# Patient Record
Sex: FEMALE | Race: WHITE | NOT HISPANIC OR LATINO | URBAN - METROPOLITAN AREA
[De-identification: names, ages, dates, MRNs, and addresses within clinical notes are randomized per-mention and may not be internally consistent; named-entity substitution may affect disease eponyms.]

---

## 2019-10-28 ENCOUNTER — APPOINTMENT (RX ONLY)
Dept: URBAN - METROPOLITAN AREA CLINIC 95 | Facility: CLINIC | Age: 69
Setting detail: DERMATOLOGY
End: 2019-10-28

## 2019-10-28 DIAGNOSIS — D22 MELANOCYTIC NEVI: ICD-10-CM

## 2019-10-28 DIAGNOSIS — L81.4 OTHER MELANIN HYPERPIGMENTATION: ICD-10-CM

## 2019-10-28 DIAGNOSIS — L82.1 OTHER SEBORRHEIC KERATOSIS: ICD-10-CM

## 2019-10-28 DIAGNOSIS — L21.8 OTHER SEBORRHEIC DERMATITIS: ICD-10-CM

## 2019-10-28 DIAGNOSIS — D18.0 HEMANGIOMA: ICD-10-CM

## 2019-10-28 PROBLEM — D22.9 MELANOCYTIC NEVI, UNSPECIFIED: Status: ACTIVE | Noted: 2019-10-28

## 2019-10-28 PROBLEM — D18.01 HEMANGIOMA OF SKIN AND SUBCUTANEOUS TISSUE: Status: ACTIVE | Noted: 2019-10-28

## 2019-10-28 PROCEDURE — ? PRESCRIPTION

## 2019-10-28 PROCEDURE — 99202 OFFICE O/P NEW SF 15 MIN: CPT

## 2019-10-28 PROCEDURE — ? COUNSELING

## 2019-10-28 RX ORDER — SALICYLIC ACID 60 MG/ML
LOTION TOPICAL
Qty: 1 | Refills: 2 | Status: ERX | COMMUNITY
Start: 2019-10-28

## 2019-10-28 RX ADMIN — SALICYLIC ACID: 60 LOTION TOPICAL at 14:41

## 2019-10-28 NOTE — PROCEDURE: MIPS QUALITY
Quality 111:Pneumonia Vaccination Status For Older Adults: Pneumococcal Vaccination not Administered or Previously Received, Reason not Otherwise Specified
Detail Level: Detailed
Quality 130: Documentation Of Current Medications In The Medical Record: Current Medications Documented
Quality 110: Preventive Care And Screening: Influenza Immunization: Influenza Immunization not Administered because Patient Refused.
Quality 431: Preventive Care And Screening: Unhealthy Alcohol Use - Screening: Patient screened for unhealthy alcohol use using a single question and scores less than 2 times per year

## 2019-10-28 NOTE — HPI: RASH (PSORIASIS)
How Severe Is Your Psoriasis?: moderate
Do You Have A Family History Of Psoriasis?: yes
Is This A New Presentation, Or A Follow-Up?: Psoriasis
Additional History: Patient will like a refill on her salicylic acid .

## 2024-10-23 ENCOUNTER — APPOINTMENT (RX ONLY)
Dept: URBAN - METROPOLITAN AREA CLINIC 342 | Facility: CLINIC | Age: 74
Setting detail: DERMATOLOGY
End: 2024-10-23

## 2024-10-23 DIAGNOSIS — L81.9 DISORDER OF PIGMENTATION, UNSPECIFIED: ICD-10-CM | Status: WORSENING

## 2024-10-23 DIAGNOSIS — D18.0 HEMANGIOMA: ICD-10-CM | Status: STABLE

## 2024-10-23 DIAGNOSIS — L81.4 OTHER MELANIN HYPERPIGMENTATION: ICD-10-CM | Status: STABLE

## 2024-10-23 DIAGNOSIS — L57.8 OTHER SKIN CHANGES DUE TO CHRONIC EXPOSURE TO NONIONIZING RADIATION: ICD-10-CM | Status: STABLE

## 2024-10-23 DIAGNOSIS — L82.1 OTHER SEBORRHEIC KERATOSIS: ICD-10-CM | Status: STABLE

## 2024-10-23 DIAGNOSIS — D22 MELANOCYTIC NEVI: ICD-10-CM | Status: STABLE

## 2024-10-23 PROBLEM — D22.5 MELANOCYTIC NEVI OF TRUNK: Status: ACTIVE | Noted: 2024-10-23

## 2024-10-23 PROBLEM — D18.01 HEMANGIOMA OF SKIN AND SUBCUTANEOUS TISSUE: Status: ACTIVE | Noted: 2024-10-23

## 2024-10-23 PROCEDURE — 99203 OFFICE O/P NEW LOW 30 MIN: CPT

## 2024-10-23 PROCEDURE — ? ADDITIONAL NOTES

## 2024-10-23 PROCEDURE — ? COUNSELING

## 2024-10-23 PROCEDURE — ? FULL BODY SKIN EXAM

## 2024-10-23 PROCEDURE — ? TREATMENT REGIMEN

## 2024-10-23 ASSESSMENT — LOCATION DETAILED DESCRIPTION DERM
LOCATION DETAILED: LEFT VENTRAL PROXIMAL FOREARM
LOCATION DETAILED: LEFT INFERIOR MEDIAL UPPER BACK
LOCATION DETAILED: RIGHT INFERIOR CENTRAL MALAR CHEEK
LOCATION DETAILED: SUPERIOR LUMBAR SPINE
LOCATION DETAILED: LEFT CENTRAL MALAR CHEEK
LOCATION DETAILED: RIGHT ANTERIOR SHOULDER
LOCATION DETAILED: LEFT ANTERIOR SHOULDER
LOCATION DETAILED: RIGHT MEDIAL FOREHEAD
LOCATION DETAILED: INFERIOR THORACIC SPINE
LOCATION DETAILED: UPPER STERNUM
LOCATION DETAILED: RIGHT DISTAL PRETIBIAL REGION
LOCATION DETAILED: LEFT DISTAL PRETIBIAL REGION
LOCATION DETAILED: RIGHT VENTRAL PROXIMAL FOREARM
LOCATION DETAILED: RIGHT POSTERIOR SHOULDER

## 2024-10-23 ASSESSMENT — LOCATION SIMPLE DESCRIPTION DERM
LOCATION SIMPLE: LEFT CHEEK
LOCATION SIMPLE: RIGHT PRETIBIAL REGION
LOCATION SIMPLE: LEFT FOREARM
LOCATION SIMPLE: LEFT PRETIBIAL REGION
LOCATION SIMPLE: UPPER BACK
LOCATION SIMPLE: LEFT UPPER BACK
LOCATION SIMPLE: RIGHT CHEEK
LOCATION SIMPLE: LOWER BACK
LOCATION SIMPLE: CHEST
LOCATION SIMPLE: RIGHT SHOULDER
LOCATION SIMPLE: LEFT SHOULDER
LOCATION SIMPLE: RIGHT FOREHEAD
LOCATION SIMPLE: RIGHT FOREARM

## 2024-10-23 ASSESSMENT — LOCATION ZONE DERM
LOCATION ZONE: FACE
LOCATION ZONE: TRUNK
LOCATION ZONE: ARM
LOCATION ZONE: LEG

## 2024-10-23 NOTE — PROCEDURE: ADDITIONAL NOTES
Detail Level: Zone
Render Risk Assessment In Note?: no
Additional Notes: Referred to our  [facility] for evaluation and treatment.

## 2024-11-11 ENCOUNTER — APPOINTMENT (RX ONLY)
Dept: URBAN - METROPOLITAN AREA CLINIC 342 | Facility: CLINIC | Age: 74
Setting detail: DERMATOLOGY
End: 2024-11-11

## 2024-11-11 DIAGNOSIS — Z41.9 ENCOUNTER FOR PROCEDURE FOR PURPOSES OTHER THAN REMEDYING HEALTH STATE, UNSPECIFIED: ICD-10-CM

## 2024-11-11 PROCEDURE — ? COSMETIC CONSULTATION: IPL

## 2024-11-11 PROCEDURE — ? ADDITIONAL NOTES

## 2024-11-13 ENCOUNTER — APPOINTMENT (RX ONLY)
Dept: URBAN - METROPOLITAN AREA CLINIC 342 | Facility: CLINIC | Age: 74
Setting detail: DERMATOLOGY
End: 2024-11-13

## 2024-11-13 DIAGNOSIS — Z41.9 ENCOUNTER FOR PROCEDURE FOR PURPOSES OTHER THAN REMEDYING HEALTH STATE, UNSPECIFIED: ICD-10-CM

## 2024-11-13 PROCEDURE — ? ADDITIONAL NOTES

## 2024-11-13 PROCEDURE — ? PRODUCT LINE (REVISION)

## 2024-11-13 PROCEDURE — ? PLASMA PEN

## 2024-11-13 PROCEDURE — ? VENUS VERSA IPL

## 2024-11-13 ASSESSMENT — LOCATION SIMPLE DESCRIPTION DERM
LOCATION SIMPLE: LEFT ANTERIOR NECK
LOCATION SIMPLE: RIGHT CHEEK
LOCATION SIMPLE: RIGHT ANTERIOR NECK
LOCATION SIMPLE: LEFT EYEBROW
LOCATION SIMPLE: LEFT CHEEK

## 2024-11-13 ASSESSMENT — LOCATION ZONE DERM
LOCATION ZONE: NECK
LOCATION ZONE: FACE

## 2024-11-13 ASSESSMENT — LOCATION DETAILED DESCRIPTION DERM
LOCATION DETAILED: LEFT LATERAL MALAR CHEEK
LOCATION DETAILED: LEFT LATERAL EYEBROW
LOCATION DETAILED: LEFT INFERIOR CENTRAL MALAR CHEEK
LOCATION DETAILED: RIGHT INFERIOR LATERAL NECK
LOCATION DETAILED: LEFT INFERIOR LATERAL NECK
LOCATION DETAILED: RIGHT LATERAL MALAR CHEEK

## 2024-11-13 NOTE — PROCEDURE: PLASMA PEN
Consent: The patient's consent was obtained including but not limited to risks of crusting, scabbing, blistering, scarring, darker or lighter pigmentary change, recurrence, incomplete removal and infection.
Location #2: left side of neck, right side of neck
Location #3: left upper temple region
Plasmapen: Plasma Pen Classic
Price (Use Numbers Only, No Special Characters Or $): 150.00
Detail Level: Detailed
Treatment Number (Optional): 1
Post-Care Instructions: Email me with any questions you have\\[email or web address]\\n\\nI reviewed with the patient in detail post-care instructions.\\n\\nEvery morning and evening repeat:\\nCleanse with gentle hydrating cleanser such as cerave\\nApply globs of aquaphor multiple times a day (this is imperative to the outcome of your treatment and to avoid scabbing and hyperpigmentation complications)\\n\\nFollow instructions for one week (7 days)\\nDo not use any other products for one week on the area\\nAvoid make up and sun exposure
Location #1: left side of cheek, right side of cheek
Topical Anesthesia Type: BLT cream (benzocaine 20%, lidocaine 10%, tetracaine 4%)

## 2024-11-13 NOTE — PROCEDURE: VENUS VERSA IPL
Cooling: 60
Number Of Passes: 1
Frequency: 1 Hz
Treated Area: medium area
Detail Level: Zone
Post-Care Instructions: I reviewed with the patient in detail post-care instructions. Advised patient to cleanse face and neck with cerave hydrating cleanser and apply a significant layer of Aquaphor over cheeks and neck, twice daily for one week.\\n\\n*you may need to reapply Aquaphor throughout the day. Do not let your skin dry up as this will result in scabs developing and you risk the development of hyperpigmentation. Avoid as much sun exposure as possible. Do not apply make up, sunscreen or any other products until you are seen for your follow up.\\n\\nEmail me with any questions or concerns\\[email or web address]
Pulse Duration (In Milliseconds): 15
Anesthesia Volume In Cc: 0
Applicator: [facility]
Fluence: 17
Pulse Mode: single
Consent: Written consent obtained, risks reviewed including but not limited to crusting, scabbing, blistering, scarring, darker or lighter pigmentary change, bruising, and/or incomplete response.
Price (Use Numbers Only, No Special Characters Or $): 350.00
Fluence Units: J/cm2

## 2024-11-13 NOTE — PROCEDURE: ADDITIONAL NOTES
Render Risk Assessment In Note?: yes
Additional Notes: Patient had difficulty with setting realistic expectations and seemed perplexed as to what treatment treated what concern. Reviewed this with the patient numerous times and patient still demonstrated confusion. I reiterated to the patient multiple times she may require more than one treatment to get the results that she is seeking. Patient stated understanding but continued to express unrealistic expectations.\\n\\nPatient also had difficulty getting through treatment as she was very uncomfortable.\\nUtilized IPL for sunspots and telangiectasia, and subnovii for sks and skin tags.\\nEducated patient on the treatments we were performing today and for what ailment multiple times as patient did not understand. I asked the patient several times if she felt comfortable proceeding with treatment and she insisted.
Detail Level: Zone

## 2024-11-13 NOTE — PROCEDURE: PRODUCT LINE (REVISION)
Product 34 Price (In Dollars - Numeric Only, No Special Characters Or $): 0.00
Name Of Product 8: Dej night cream
Product 12 Units: 0
Product 10 Price (In Dollars - Numeric Only, No Special Characters Or $): 197.00
Product 14 Application Directions: Use twice daily, morning and night
Name Of Product 17: Papaya Cleanser
Detail Level: Zone
Name Of Product 24: Retinol 0.5%
Name Of Product 5: DEJ Night Face Cream
Product 12 Application Directions: Apply at least 2x daily
Name Of Product 3: DEJ Face Cream
Product 19 Price (In Dollars - Numeric Only, No Special Characters Or $): 48.00
Product 8 Price (In Dollars - Numeric Only, No Special Characters Or $): 140.00
Name Of Product 15: Dej Face cream (travel size)
Product 19 Application Directions: Use once a week
Name Of Product 22: Vitamin K
Product 24 Price (In Dollars - Numeric Only, No Special Characters Or $): 121.00
Product 5 Price (In Dollars - Numeric Only, No Special Characters Or $): 165.00
Name Of Product 13: Pumpkin Enzyme Mask
Product 17 Price (In Dollars - Numeric Only, No Special Characters Or $): 38.00
Name Of Product 1: Vitamin C+ Corrective Cream 30%
Assigning Risk Information: Per AMA, level of risk is based upon consequences of the problem(s) addressed at the encounter when appropriately treated. Risk also includes medical decision making related to the need to initiate or forego further testing, treatment and/or hospitalization. Over the counter medication are assigned a risk level of low. Prescription medication management is assigned a risk level of moderate.
Product 5 Application Directions: Begin using product once a week at night only for one month
Product 6 Price (In Dollars - Numeric Only, No Special Characters Or $): 114.00
Product 15 Price (In Dollars - Numeric Only, No Special Characters Or $): 79.00
Name Of Product 20: Soothing Facial Rinse
Product 22 Price (In Dollars - Numeric Only, No Special Characters Or $): 50.00
Risk Of Complication Category: No MDM
Name Of Product 11: Revox Line Relaxer
Product 24 Application Directions: Use one pump Monday and Thursday nights after cleansing, then apply remaining products
Name Of Product 6: Hydrating Serum
Product 17 Application Directions: Wash face every other day with this cleanser and rotate with gentle cleanser
Product 20 Price (In Dollars - Numeric Only, No Special Characters Or $): 40.00
Product 8 Application Directions: After you have cleansed face, apply a pea sized amount (1 pump) to hand, take other hand and dot the product onto your face at night. Apply moisturizer on top of retinol if becomes too drying\\n\\nUse Monday + Thursday Night
Product 11 Price (In Dollars - Numeric Only, No Special Characters Or $): 321.00
Product 1 Units: 1
Product 15 Application Directions: Use twice daily
Name Of Product 25: CMT post procedure care
Product 25 Price (In Dollars - Numeric Only, No Special Characters Or $): 70.00
Name Of Product 18: Gentle Cleansing Lotion
Name Of Product 9: Lumiquin hand cream
Allow Plan To Count Towards E/M Coding: Yes
Name Of Product 23: C+ eye cream
Product 20 Application Directions: Recommended daily use unless dryness occurs
Product 13 Application Directions: Using fingertips, apply generous amount hands, lightly scrub mask onto face avoiding eye area. Leave mask on 15-20 minutes. Remove with warm water on a clean wash cloth. Use once weekly.
Name Of Product 16: Vitamin C 15%
Name Of Product 4: DEJ eye cream
Name Of Product 2: Nectifirm Advanced
Product 9 Price (In Dollars - Numeric Only, No Special Characters Or $): 58.00
Product 16 Price (In Dollars - Numeric Only, No Special Characters Or $): 133.00
Name Of Product 21: Intellishade Truphysical
Name Of Product 14: Brightening face cleanser
Product 4 Price (In Dollars - Numeric Only, No Special Characters Or $): 120.00
Product 2 Price (In Dollars - Numeric Only, No Special Characters Or $): 159.00
Name Of Product 12: Youthful lip replenisher
Product 18 Application Directions: Utilize every other day
Product 21 Price (In Dollars - Numeric Only, No Special Characters Or $): 168.00
Product 12 Price (In Dollars - Numeric Only, No Special Characters Or $): 36.00
Product 16 Application Directions: Apply one pump to palm of hand and apply to face every morning before moisturizer
Name Of Product 19: Finishing Touch

## 2024-11-27 ENCOUNTER — APPOINTMENT (RX ONLY)
Dept: URBAN - METROPOLITAN AREA CLINIC 342 | Facility: CLINIC | Age: 74
Setting detail: DERMATOLOGY
End: 2024-11-27

## 2024-11-27 DIAGNOSIS — Z41.9 ENCOUNTER FOR PROCEDURE FOR PURPOSES OTHER THAN REMEDYING HEALTH STATE, UNSPECIFIED: ICD-10-CM

## 2024-11-27 PROCEDURE — ? COSMETIC FOLLOW-UP

## 2024-11-27 NOTE — PROCEDURE: COSMETIC FOLLOW-UP
Detail Level: Zone
Side Effects Or Complications: None
Global Improvement: Good
Comments (Free Text): Took after photos and reviewed with the patient today. Improvement is noted. Patient agrees photos show improvement, however patient states she is unhappy with the amount of improvement. I reiterated to the patient as we discussed prior to treatment that multiple treatments were needed to accomplish the results she was seeking. Patient stated she can’t afford to proceed with treatment for at least 6 months from now due to utilizing [employer]. I reviewed with the patient, by waiting 6 months, she most likely will accrue more hyperpigmentation as well as telangiectasia. When correcting these skin concerns it’s imperative to do a series of treatments, then maintain every 6-9 months. Patient states understanding

## 2024-11-27 NOTE — HPI: COSMETIC FOLLOW UP
How Did You Tolerate The Procedure?: well, without problems
What Condition Are We Treating?: Hyperpigmentation, seborrheic keratosis, skin tags and telangiectasia
What Procedure Did We Perform At The Last Visit?: IPL and subnovii